# Patient Record
Sex: FEMALE | Race: WHITE
[De-identification: names, ages, dates, MRNs, and addresses within clinical notes are randomized per-mention and may not be internally consistent; named-entity substitution may affect disease eponyms.]

---

## 2019-10-25 ENCOUNTER — HOSPITAL ENCOUNTER (EMERGENCY)
Dept: HOSPITAL 97 - ER | Age: 29
LOS: 1 days | Discharge: HOME | End: 2019-10-26
Payer: COMMERCIAL

## 2019-10-25 DIAGNOSIS — O20.0: Primary | ICD-10-CM

## 2019-10-25 DIAGNOSIS — Z3A.13: ICD-10-CM

## 2019-10-25 DIAGNOSIS — O23.41: ICD-10-CM

## 2019-10-25 LAB
HCT VFR BLD CALC: 36.4 % (ref 36–45)
LYMPHOCYTES # SPEC AUTO: 1.8 K/UL (ref 0.7–4.9)
PMV BLD: 7.8 FL (ref 7.6–11.3)
RBC # BLD: 4 M/UL (ref 3.86–4.86)

## 2019-10-25 PROCEDURE — 85025 COMPLETE CBC W/AUTO DIFF WBC: CPT

## 2019-10-25 PROCEDURE — 87088 URINE BACTERIA CULTURE: CPT

## 2019-10-25 PROCEDURE — 87086 URINE CULTURE/COLONY COUNT: CPT

## 2019-10-25 PROCEDURE — 36415 COLL VENOUS BLD VENIPUNCTURE: CPT

## 2019-10-25 PROCEDURE — 76817 TRANSVAGINAL US OBSTETRIC: CPT

## 2019-10-25 PROCEDURE — 81025 URINE PREGNANCY TEST: CPT

## 2019-10-25 PROCEDURE — 84702 CHORIONIC GONADOTROPIN TEST: CPT

## 2019-10-25 PROCEDURE — 96374 THER/PROPH/DIAG INJ IV PUSH: CPT

## 2019-10-25 PROCEDURE — 86901 BLOOD TYPING SEROLOGIC RH(D): CPT

## 2019-10-25 PROCEDURE — 96361 HYDRATE IV INFUSION ADD-ON: CPT

## 2019-10-25 PROCEDURE — 99284 EMERGENCY DEPT VISIT MOD MDM: CPT

## 2019-10-25 PROCEDURE — 80048 BASIC METABOLIC PNL TOTAL CA: CPT

## 2019-10-25 PROCEDURE — 86900 BLOOD TYPING SEROLOGIC ABO: CPT

## 2019-10-25 PROCEDURE — 81003 URINALYSIS AUTO W/O SCOPE: CPT

## 2019-10-26 VITALS — DIASTOLIC BLOOD PRESSURE: 78 MMHG | OXYGEN SATURATION: 100 % | SYSTOLIC BLOOD PRESSURE: 121 MMHG

## 2019-10-26 VITALS — TEMPERATURE: 98 F

## 2019-10-26 LAB
BUN BLD-MCNC: 8 MG/DL (ref 7–18)
GLUCOSE SERPLBLD-MCNC: 92 MG/DL (ref 74–106)
HCG SERPL-ACNC: (no result) MIU/ML (ref 1–3)
POTASSIUM SERPL-SCNC: 3.5 MMOL/L (ref 3.5–5.1)

## 2019-10-26 NOTE — RAD REPORT
EXAM DESCRIPTION:  US - Transvaginal OB - 10/26/2019 12:12 am

 

CLINICAL HISTORY:   Pregnancy with pelvic pain and vaginal bleeding

 

COMPARISON:  None.

 

FINDINGS:   The uterus measures 14 x 6 x 9 centimeters. A normal appearing gestational sac is present
 within the endometrium. Within this is a yolk sac and fetal pole. Cardiac activity 155 beats per min
marissa. Cephalic presentation

 

BPD 2.3 centimeters 13 weeks 5 days

 

HC 8.5 centimeters 13 weeks 5 days

 

AC 7.2 centimeters 13 weeks 5 days

 

FL 1.2 centimeters 13 weeks 3 days

 

Cervix 3.7 centimeters

 

Posterior placenta. The tip lies 3.2 centimeters from the cervix

 

Neither ovary was visualized

 

No significant free fluid is seen.

 

IMPRESSION:   Single live intrauterine pregnancy with an estimated gestational age 13 weeks 4 days ED
D 04/27/2020

 

Cephalic presentation

 

If a fetal survey is desired it should be performed in approximately 5 weeks

## 2019-10-26 NOTE — ER
Nurse's Notes                                                                                     

 [facility]                                                                 

Name: Donald Perera                                                                          

Age: 29 yrs                                                                                       

Sex: Female                                                                                       

: 1990                                                                                   

MRN: [id number]                                                                                   

Arrival Date: 10/25/2019                                                                          

Time: 22:47                                                                                       

Account#: [id number]                                                                            

Bed 5                                                                                             

Private MD: Shima Irving K                                                                     

Diagnosis: Pregnancy related conditions, unspecified, second trimester;Threatened ;Urinary

  tract infection, site not specified                                                             

                                                                                                  

Presentation:                                                                                     

10/25                                                                                             

23:00 Presenting complaint: Patient states: I went to Dr. Malcolm yesterday due to spotting     jb4 

      that had stopped after I got there. Today about 20 minutes ago I started bleeding           

      bright red blood and having cramps. Transition of care: patient was not received from       

      another setting of care. Onset of symptoms was 2019. Risk Assessment: Do        

      you want to hurt yourself or someone else? Patient reports no desire to harm self or        

      others. Initial Sepsis Screen: Does the patient meet any 2 criteria? No. Patient's          

      initial sepsis screen is negative. Does the patient have a suspected source of              

      infection? No. Patient's initial sepsis screen is negative. Care prior to arrival: CPR.     

23:00 Method Of Arrival: Ambulatory                                                           jb4 

23:00 Acuity: CHRIS 3                                                                           jb4 

                                                                                                  

OB/GYN:                                                                                           

23:02 LMP N/A - Currently pregnant.                                                           jb4 

23:20  2, Full Term 1, Premature 0,  0, Living 0                               arnaud 

                                                                                                  

Historical:                                                                                       

- Allergies:                                                                                      

23:02 No Known Allergies;                                                                     jb4 

- Home Meds:                                                                                      

23:02 Prenatal Vitamin Oral [Active];                                                         jb4 

- PMHx:                                                                                           

23:02 None;                                                                                   jb4 

- PSHx:                                                                                           

23:02 None;                                                                                   jb4 

                                                                                                  

- Immunization history:: Adult Immunizations up to date.                                          

- Social history:: Smoking status: Patient/guardian denies using tobacco,                         

  Patient/guardian denies using alcohol.                                                          

- Ebola Screening: : No symptoms or risks identified at this time.                                

- Family history:: not pertinent.                                                                 

                                                                                                  

                                                                                                  

Screenin:04 Abuse screen: Denies threats or abuse. Nutritional screening: No deficits noted.        jb4 

      Tuberculosis screening: No symptoms or risk factors identified. Fall Risk None              

      identified.                                                                                 

                                                                                                  

Assessment:                                                                                       

23:04 General: Appears in no apparent distress. uncomfortable, Behavior is calm, cooperative, jb4 

      appropriate for age. Pain: Complains of pain in abdomen Pain does not radiate. Pain         

      currently is 4 out of 10 on a pain scale. Quality of pain is described as crampy.           

      Neuro: Level of Consciousness is awake, alert, obeys commands, Oriented to person,          

      place, time, situation. Cardiovascular: Patient's skin is warm and dry. Respiratory:        

      Airway is patent Respiratory effort is even, unlabored, Respiratory pattern is regular,     

      symmetrical. GI: No deficits noted. No signs and/or symptoms were reported involving        

      the gastrointestinal system. : tamara blood, Reports vaginal bleeding that is bright       

      red. EENT: No deficits noted. No signs and/or symptoms were reported regarding the EENT     

      system. Derm: Skin is intact, Skin is pink, warm \T\ dry. Musculoskeletal: Circulation,     

      motion, and sensation intact. Range of motion: intact in all extremities.                   

10/26                                                                                             

00:32 Reassessment: Patient appears in no apparent distress at this time. Patient and/or      jb4 

      family updated on plan of care and expected duration. Pain level reassessed. Patient is     

      alert, oriented x 3, equal unlabored respirations, skin warm/dry/pink.                      

01:18 Reassessment: Patient appears in no apparent distress at this time. Patient and/or      jb4 

      family updated on plan of care and expected duration. Pain level reassessed. Patient is     

      alert, oriented x 3, equal unlabored respirations, skin warm/dry/pink.                      

                                                                                                  

Vital Signs:                                                                                      

10/25                                                                                             

23:02  / 73; Pulse 71; Resp 18; Temp 98.0(O); Pulse Ox 100% on R/A; Weight 108.86 kg    jb4 

      (R); Height 5 ft. 7 in. (170.18 cm) (R); Pain 4/10;                                         

10/26                                                                                             

00:15  / 78; Pulse 78; Resp 16; Pulse Ox 100% on R/A;                                   jb4 

01:15  / 76; Pulse 76; Resp 16; Pulse Ox 100% on R/A;                                   jb4 

10/25                                                                                             

23:02 Body Mass Index 37.59 (108.86 kg, 170.18 cm)                                            [facility] 

                                                                                                  

ED Course:                                                                                        

10/25                                                                                             

22:47 Patient arrived in ED.                                                                  mr  

22:48 Shima Irving MD is Private Physician.                                                mr  

23:00 Scott Manzano, RN is Primary Nurse.                                                     4 

23:01 Triage completed.                                                                       4 

23:02 Barron Stanley MD is Attending Physician.                                             arnaud 

23:02 Arm band placed on left wrist.                                                          jb4 

23:04 Patient has correct armband on for positive identification. Placed in gown. Bed in low  jb4 

      position. Call light in reach. Side rails up X 1. Pulse ox on. NIBP on. Warm blanket        

      given.                                                                                      

23:34 Initial lab(s) drawn, by me, sent to lab. Inserted saline lock: 20 gauge in right       jb4 

      antecubital area, using aseptic technique. Blood collected.                                 

10/26                                                                                             

00:15 Ultrasound completed. Patient tolerated well. Notified ED Physician delvin.           sg3 

00:16 US Transvaginal Ob In Process Unspecified.                                              [facility]

01:04 Endy Bonilla MD is Referral Physician.                                              [facility] 

01:19 No provider procedures requiring assistance completed. IV discontinued, intact,         jb4 

      bleeding controlled, No redness/swelling at site. Pressure dressing applied.                

                                                                                                  

Administered Medications:                                                                         

10/25                                                                                             

23:54 Drug: NS 0.9% 1000 ml Route: IV; Rate: 1 bolus; Site: right antecubital;                jb4 

10/26                                                                                             

01:18 Follow up: Response: No adverse reaction; IV Status: Order to discontinue infusion; IV  jb4 

      Intake: 800ml                                                                               

01:17 Drug: Rocephin 1 grams Route: IV; Rate: per protocol; Site: right antecubital;          jb4 

01:18 Follow up: Response: No adverse reaction; Medication administered at discharge.; IV     jb4 

      Status: Completed infusion                                                                  

                                                                                                  

                                                                                                  

Intake:                                                                                           

01:18 IV: 800ml; Total: 800ml.                                                                jb4 

                                                                                                  

Outcome:                                                                                          

01:04 Discharge ordered by MD.                                                                [facility] 

01:19 Discharged to home ambulatory, with family.                                             jb4 

01:19 Condition: stable                                                                           

01:19 Discharge instructions given to patient, family, Instructed on discharge instructions,      

      follow up and referral plans. medication usage, Demonstrated understanding of               

      instructions, follow-up care, medications, Prescriptions given X 2.                         

01:20 Patient left the ED.                                                                    jb4 

                                                                                                  

Signatures:                                                                                       

Dispatcher [employer]                           Barron Trinh MD MD cha Rivera, Scott Ramon RN                       RN   jb4                                                  

Marta Guerrero                               sg3                                                  

                                                                                                  

************************************************************************************************** COMFORT care.  end of life care

## 2019-10-26 NOTE — EDPHYS
Physician Documentation                                                                           

 [facility]                                                                 

Name: Donald Perera                                                                          

Age: 29 yrs                                                                                       

Sex: Female                                                                                       

: 1990                                                                                   

MRN: [id number]                                                                                   

Arrival Date: 10/25/2019                                                                          

Time: 22:47                                                                                       

Account#: [id number]                                                                            

Bed 5                                                                                             

Private MD: Shima Irving K ED Physician Barron Stanley                                                                      

HPI:                                                                                              

10/25                                                                                             

23:20 This 29 yrs old  Female presents to ER via Ambulatory with complaints of 13    arnaud 

      wks pregnant, Vaginal Bleeding.                                                             

23:20 The patient presents with vaginal bleeding that is light. Onset: The symptoms/episode   arnaud 

      began/occurred 1 day(s) ago. Modifying factors: The symptoms are alleviated by nothing,     

      the symptoms are aggravated by nothing. Modifying factors: The symptoms are alleviated      

      by. Associated signs and symptoms: The patient has no apparent associated signs or          

      symptoms. Severity of symptoms: At their worst the symptoms were mild. The patient is       

      sexually active, reportedly has a single partner.                                           

                                                                                                  

OB/GYN:                                                                                           

23:02 LMP N/A - Currently pregnant.                                                           jb4 

23:20  2, Full Term 1, Premature 0,  0, Living 0                               arnaud 

                                                                                                  

Historical:                                                                                       

- Allergies:                                                                                      

23:02 No Known Allergies;                                                                     jb4 

- Home Meds:                                                                                      

23:02 Prenatal Vitamin Oral [Active];                                                         jb4 

- PMHx:                                                                                           

23:02 None;                                                                                   jb4 

- PSHx:                                                                                           

23:02 None;                                                                                   jb4 

                                                                                                  

- Immunization history:: Adult Immunizations up to date.                                          

- Social history:: Smoking status: Patient/guardian denies using tobacco,                         

  Patient/guardian denies using alcohol.                                                          

- Ebola Screening: : No symptoms or risks identified at this time.                                

- Family history:: not pertinent.                                                                 

                                                                                                  

                                                                                                  

ROS:                                                                                              

23:20 Constitutional: Negative for fever, chills, and weight loss, Eyes: Negative for injury, arnaud 

      pain, redness, and discharge, ENT: Negative for injury, pain, and discharge, Neck:          

      Negative for injury, pain, and swelling, Cardiovascular: Negative for chest pain,           

      palpitations, and edema, Respiratory: Negative for shortness of breath, cough,              

      wheezing, and pleuritic chest pain, Abdomen/GI: Negative for abdominal pain, nausea,        

      vomiting, diarrhea, and constipation, Back: Negative for injury and pain, MS/Extremity:     

      Negative for injury and deformity, Skin: Negative for injury, rash, and discoloration,      

      Neuro: Negative for headache, weakness, numbness, tingling, and seizure, Psych:             

      Negative for depression, anxiety, suicide ideation, homicidal ideation, and                 

      hallucinations, Allergy/Immunology: Negative for hives, rash, and allergies, Endocrine:     

      Negative for neck swelling, polydipsia, polyuria, polyphagia, and marked weight             

      changes, Hematologic/Lymphatic: Negative for swollen nodes, abnormal bleeding, and          

      unusual bruising.                                                                           

23:20 : Positive for pelvic pain, vaginal bleeding, of the suprapubic area.                     

                                                                                                  

Exam:                                                                                             

23:20 Constitutional:  This is a well developed, well nourished patient who is awake, alert,  arnaud 

      and in no acute distress. Head/Face:  Normocephalic, atraumatic. Eyes:  Pupils equal        

      round and reactive to light, extra-ocular motions intact.  Lids and lashes normal.          

      Conjunctiva and sclera are non-icteric and not injected.  Cornea within normal limits.      

      Periorbital areas with no swelling, redness, or edema. ENT:  Nares patent. No nasal         

      discharge, no septal abnormalities noted.  Tympanic membranes are normal and external       

      auditory canals are clear.  Oropharynx with no redness, swelling, or masses, exudates,      

      or evidence of obstruction, uvula midline.  Mucous membranes moist. Neck:  Trachea          

      midline, no thyromegaly or masses palpated, and no cervical lymphadenopathy.  Supple,       

      full range of motion without nuchal rigidity, or vertebral point tenderness.  No            

      Meningismus. Chest/axilla:  Normal chest wall appearance and motion.  Nontender with no     

      deformity.  No lesions are appreciated. Cardiovascular:  Regular rate and rhythm with a     

      normal S1 and S2.  No gallops, murmurs, or rubs.  Normal PMI, no JVD.  No pulse             

      deficits. Respiratory:  Lungs have equal breath sounds bilaterally, clear to                

      auscultation and percussion.  No rales, rhonchi or wheezes noted.  No increased work of     

      breathing, no retractions or nasal flaring. Abdomen/GI:  Soft, non-tender, with normal      

      bowel sounds.  No distension or tympany.  No guarding or rebound.  No evidence of           

      tenderness throughout. Back:  No spinal tenderness.  No costovertebral tenderness.          

      Full range of motion. MS/ Extremity:  Pulses equal, no cyanosis.  Neurovascular intact.     

       Full, normal range of motion. Neuro:  Awake and alert, GCS 15, oriented to person,         

      place, time, and situation.  Cranial nerves II-XII grossly intact.  Motor strength 5/5      

      in all extremities.  Sensory grossly intact.  Cerebellar exam normal.  Normal gait.         

      Psych:  Awake, alert, with orientation to person, place and time.  Behavior, mood, and      

      affect are within normal limits.                                                            

23:20 Abdomen/GI: Inspection: gravid appearance, obese Bowel sounds: active, Palpation: mild      

      abdominal tenderness, in the suprapubic area, right lower quadrant and left lower           

      quadrant, Liver: no appreciated palpable abnormalities, Hernia: not appreciated.            

23:20 : CVA tenderness, is absent, Sexual behavior: the patient is sexually active.             

                                                                                                  

Vital Signs:                                                                                      

23:02  / 73; Pulse 71; Resp 18; Temp 98.0(O); Pulse Ox 100% on R/A; Weight 108.86 kg    jb4 

      (R); Height 5 ft. 7 in. (170.18 cm) (R); Pain 4/10;                                         

10/26                                                                                             

00:15  / 78; Pulse 78; Resp 16; Pulse Ox 100% on R/A;                                   jb4 

01:15  / 76; Pulse 76; Resp 16; Pulse Ox 100% on R/A;                                   jb4 

10/25                                                                                             

23:02 Body Mass Index 37.59 (108.86 kg, 170.18 cm)                                            jb4 

                                                                                                  

MDM:                                                                                              

10/25                                                                                             

23:02 Patient medically screened.                                                             [facility] 

23:24 Data reviewed: vital signs, nurses notes, lab test result(s), radiologic studies,       arnaud 

      ultrasound.                                                                                 

                                                                                                  

10/25                                                                                             

23:19 Order name: Quantitative Hcg; Complete Time: 01:04                                      [facility] 

10/25                                                                                             

23:19 Order name: Abo/rh Typing; Complete Time: 01:04                                         [facility] 

10/25                                                                                             

23:19 Order name: Basic Metabolic Panel; Complete Time: 01:04                                 [facility] 

10/25                                                                                             

23:19 Order name: CBC with Diff; Complete Time: 00:10                                         [facility] 

10/25                                                                                             

23:19 Order name: Urine Culture                                                               [facility] 

10/25                                                                                             

23:56 Order name: Urine Dipstick--Ancillary (enter results)                                   [facility] 

10/25                                                                                             

23:19 Order name: Urine Pregnancy Test (obtain specimen); Complete Time: 23:39                [facility] 

10/25                                                                                             

23:19 Order name: IV Saline Lock; Complete Time: 23:39                                        [facility] 

10/25                                                                                             

23:19 Order name: Labs collected and sent; Complete Time: 23:39                               [facility] 

10/25                                                                                             

23:19 Order name: NPO; Complete Time: 23:39                                                   [facility] 

10/25                                                                                             

23:19 Order name: US Transvaginal Ob                                                          [facility] 

10/25                                                                                             

23:56 Order name: Urine Pregnancy--Ancillary (enter results)                                  [facility] 

10/25                                                                                             

23:19 Order name: Urine Dipstick-Ancillary (obtain specimen); Complete Time: 23:39            [facility] 

                                                                                                  

Administered Medications:                                                                         

23:54 Drug: NS 0.9% 1000 ml Route: IV; Rate: 1 bolus; Site: right antecubital;                jb4 

10/26                                                                                             

01:18 Follow up: Response: No adverse reaction; IV Status: Order to discontinue infusion; IV  jb4 

      Intake: 800ml                                                                               

01:17 Drug: Rocephin 1 grams Route: IV; Rate: per protocol; Site: right antecubital;          jb4 

01:18 Follow up: Response: No adverse reaction; Medication administered at discharge.; IV     jb4 

      Status: Completed infusion                                                                  

                                                                                                  

                                                                                                  

Disposition:                                                                                      

10/26/19 01:04 Discharged to Home. Impression: Pregnancy related conditions, unspecified,         

  second trimester, Threatened , Urinary tract infection, site not                        

  specified.                                                                                      

- Condition is Stable.                                                                            

- Discharge Instructions: Threatened Miscarriage, Vaginal Bleeding During Pregnancy,              

  Second Trimester, Threatened Miscarriage, Easy-to-Read, Pelvic Rest.                            

- Prescriptions for Prenatal Vitamin 27- 0.8 mg Oral Tablet - take 1 tablet by ORAL               

  route once daily; 30 tablet. Macrobid 100 mg Oral Capsule - take 1 capsule by ORAL              

  route every 12 hours for 7 days; 14 capsule.                                                    

- Medication Reconciliation Form, Thank You Letter, Antibiotic Education, Prescription            

  Opioid Use form.                                                                                

- Follow up: Endy Bonilla; When: 1 - 2 days; Reason: Recheck today's complaints,                

  Continuance of care, Re-evaluation by your physician.                                           

- Problem is new.                                                                                 

- Symptoms have improved.                                                                         

                                                                                                  

                                                                                                  

                                                                                                  

Signatures:                                                                                       

Dispatcher [employer]                           Barron Trinh MD MD cha Bryson, James, RN                       RN   jb4                                                  

                                                                                                  

Corrections: (The following items were deleted from the chart)                                    

01:06 01:04 10/26/2019 01:04 Discharged to Home. Impression: Pregnancy related conditions,    arnaud 

      unspecified, second trimester; Threatened . Condition is Stable. Discharge          

      Instructions: Threatened Miscarriage, Vaginal Bleeding During Pregnancy, Second             

      Trimester, Threatened Miscarriage, Easy-to-Read, Pelvic Rest. Prescriptions for             

      Prenatal Vitamin 27-0.8 mg Oral Tablet - take 1 tablet by ORAL route once daily; 30         

      tablet. and Forms are Medication Reconciliation Form, Thank You Letter, Antibiotic          

      Education, Prescription Opioid Use. Follow up: Endy Bonilla; When: 1 - 2 days;            

      Reason: Recheck today's complaints, Continuance of care, Re-evaluation by your              

      physician. Problem is new. Symptoms have improved. arnaud                                      

01:20 01:06 10/26/2019 01:04 Discharged to Home. Impression: Pregnancy related conditions,    jb4 

      unspecified, second trimester; Threatened ; Urinary tract infection, site not       

      specified. Condition is Stable. Discharge Instructions: Threatened Miscarriage, Vaginal     

      Bleeding During Pregnancy, Second Trimester, Threatened Miscarriage, Easy-to-Read,          

      Pelvic Rest. Prescriptions for Prenatal Vitamin 27-0.8 mg Oral Tablet - take 1 tablet       

      by ORAL route once daily; 30 tablet. and Forms are Medication Reconciliation Form,          

      Thank You Letter, Antibiotic Education, Prescription Opioid Use. Follow up: Endy Bonilla; When: 1 - 2 days; Reason: Recheck today's complaints, Continuance of care,           

      Re-evaluation by your physician. Problem is new. Symptoms have improved. arnaud                

                                                                                                  

**************************************************************************************************

## 2020-04-08 ENCOUNTER — HOSPITAL ENCOUNTER (INPATIENT)
Dept: HOSPITAL 97 - 2ND-WC | Age: 30
LOS: 3 days | Discharge: HOME | End: 2020-04-11
Attending: SPECIALIST | Admitting: SPECIALIST
Payer: COMMERCIAL

## 2020-04-08 VITALS — BODY MASS INDEX: 40.4 KG/M2

## 2020-04-08 DIAGNOSIS — Z3A.37: ICD-10-CM

## 2020-04-08 LAB
HCT VFR BLD CALC: 34.1 % (ref 36–45)
LYMPHOCYTES # SPEC AUTO: 1.6 K/UL (ref 0.7–4.9)
PMV BLD: 10.6 FL (ref 7.6–11.3)
RBC # BLD: 3.76 M/UL (ref 3.86–4.86)
UA COMPLETE W REFLEX CULTURE PNL UR: (no result)
UA DIPSTICK W REFLEX MICRO PNL UR: (no result)

## 2020-04-08 PROCEDURE — 83735 ASSAY OF MAGNESIUM: CPT

## 2020-04-08 PROCEDURE — 99218: CPT

## 2020-04-08 PROCEDURE — 80076 HEPATIC FUNCTION PANEL: CPT

## 2020-04-08 PROCEDURE — 81015 MICROSCOPIC EXAM OF URINE: CPT

## 2020-04-08 PROCEDURE — 86901 BLOOD TYPING SEROLOGIC RH(D): CPT

## 2020-04-08 PROCEDURE — 87086 URINE CULTURE/COLONY COUNT: CPT

## 2020-04-08 PROCEDURE — 81003 URINALYSIS AUTO W/O SCOPE: CPT

## 2020-04-08 PROCEDURE — 84550 ASSAY OF BLOOD/URIC ACID: CPT

## 2020-04-08 PROCEDURE — 85730 THROMBOPLASTIN TIME PARTIAL: CPT

## 2020-04-08 PROCEDURE — 74018 RADEX ABDOMEN 1 VIEW: CPT

## 2020-04-08 PROCEDURE — 86592 SYPHILIS TEST NON-TREP QUAL: CPT

## 2020-04-08 PROCEDURE — 87088 URINE BACTERIA CULTURE: CPT

## 2020-04-08 PROCEDURE — 88307 TISSUE EXAM BY PATHOLOGIST: CPT

## 2020-04-08 PROCEDURE — 36415 COLL VENOUS BLD VENIPUNCTURE: CPT

## 2020-04-08 PROCEDURE — 85014 HEMATOCRIT: CPT

## 2020-04-08 PROCEDURE — 85025 COMPLETE CBC W/AUTO DIFF WBC: CPT

## 2020-04-08 PROCEDURE — 80048 BASIC METABOLIC PNL TOTAL CA: CPT

## 2020-04-08 PROCEDURE — 81001 URINALYSIS AUTO W/SCOPE: CPT

## 2020-04-08 PROCEDURE — 90715 TDAP VACCINE 7 YRS/> IM: CPT

## 2020-04-08 PROCEDURE — 87340 HEPATITIS B SURFACE AG IA: CPT

## 2020-04-08 PROCEDURE — 90471 IMMUNIZATION ADMIN: CPT

## 2020-04-09 LAB — RPR SER QL: (no result)

## 2020-04-09 PROCEDURE — 10907ZC DRAINAGE OF AMNIOTIC FLUID, THERAPEUTIC FROM PRODUCTS OF CONCEPTION, VIA NATURAL OR ARTIFICIAL OPENING: ICD-10-PCS

## 2020-04-09 PROCEDURE — 3E0P7VZ INTRODUCTION OF HORMONE INTO FEMALE REPRODUCTIVE, VIA NATURAL OR ARTIFICIAL OPENING: ICD-10-PCS

## 2020-04-09 PROCEDURE — 3E033VJ INTRODUCTION OF OTHER HORMONE INTO PERIPHERAL VEIN, PERCUTANEOUS APPROACH: ICD-10-PCS

## 2020-04-09 PROCEDURE — 10H073Z INSERTION OF MONITORING ELECTRODE INTO PRODUCTS OF CONCEPTION, VIA NATURAL OR ARTIFICIAL OPENING: ICD-10-PCS

## 2020-04-09 PROCEDURE — 4A1H7CZ MONITORING OF PRODUCTS OF CONCEPTION, CARDIAC RATE, VIA NATURAL OR ARTIFICIAL OPENING: ICD-10-PCS

## 2020-04-09 NOTE — P.CNS
Date of Consult: 04/09/20


Reason for Consult: Pre-eclampsia with uncontrolled blood pressure


Requesting Physician: LEELEE Bonilla


Chief Complaint: Uncontrolled blood pressure


History of Present Illness: 


29-year-old woman P1, undergoing induced labor and noted to have elevated blood 

pressure.  Patient is diagnosed with pre-eclampsia.  Her systolic blood pressure

was in the 180s and did not respond to IV hydralazine.  Patient was complaining 

of headache.  Hospitalist service consulted to assist with management of her 

blood pressure.  Noted her blood pressure improved after a dose of IV labetalol.

 Patient also mentioned her headache had gotten better after a dose of Tylenol. 

She denies any visual changes.





Allergies





latex Allergy (Verified 04/08/20 18:27)


   Itching








- Past Medical/Surgical History


Diabetic: No


-:  None


-: [place] teeth removed





- Family History


  ** Father


Medical History: Heart disease





- Social History


Smoking Status: Never smoker


Alcohol use: No


CD- Drugs: No


Caffeine use: Yes


Place of Residence: Home





Review of Systems


Other: 





Except as documented, all other systems reviewed and negative.





Physical Examination


General: Alert, In no apparent distress, Oriented x3


HEENT: Atraumatic, Normocephalic, PERRLA, Mucous membr. moist/pink, EOMI, 

Sclerae nonicteric


Neck: Supple, JVD not distended


Respiratory: Clear to auscultation bilaterally, Normal air movement


Cardiovascular: Regular rate/rhythm, Normal S1 S2, Edema (2+ bilateral lower 

extremity pitting edema)


Capillary refill: <2 Seconds


Gastrointestinal: Normal bowel sounds, Soft and benign, Other (Gravid abdomen)


Musculoskeletal: No erythema


Integumentary: No rashes


Neurological: Normal speech, Normal strength at 5/5 x4 extr, Cranial nerves 3-12

intact, Normal affect


Laboratory Data (last 24 hrs)





04/09/20 13:49: Magnesium 5.8 H* D


04/09/20 04:14: Magnesium 4.6 H*


04/08/20 16:00: WBC 9.6  D, Hgb 11.7 L, Hct 34.1 L, Plt Count 196








- Problems


(1) Preeclampsia


Current Visit: Yes   Status: Acute   





(2) Headache


Current Visit: Yes   Status: Acute   


Conclusions/Impression: 


Would recommend oral Labetalol after labor.


Labetalol starting dose 100 mg prescribed to be titrated every 2-3 days for a 

target systolic blood pressure less than 140.


Continue to give labetalol IV p.r.n. for BP spikes.  Target systolic blood 

pressure of less than 140.

## 2020-04-09 NOTE — PN
The last blood pressure is 141 systolic after 20 mg labetalol.  We will switch to labetalol now inste
ad of Apresoline and give at any time, the systolic is 165 or more or diastolic 105 or more.  We will
 try to get her membranes ruptured as soon as we can to facilitate delivery.  After delivery, I think
 the situation with her blood pressure will improve and at that point, we can give her clonidine or o
ther medicines that right now, I am stand away from because we do not want a drop the blood pressures
 to the point where the baby could be effected.





IDANIA/SURI

DD:  04/09/2020 07:54:25Voice ID:  [id number]

DT:  04/09/2020 08:32:29Report ID:  [id number]

## 2020-04-09 NOTE — PN
The patient has had two 20 mg doses of labetalol. Last blood pressure is 159 systolic.  She has a mil
d headache returning.  We will give her couple of Tylenol and of course keep a close watch on her blo
od pressures, give her another 20 mg of labetalol if needed.  She is now 3.5 cm, 60% effaced, vertex,
 -1 station.  So, she is starting to make some progress.  She is having regular contractions but they
 are still not extremely forceful.  We will continue to go up on the Pitocin until we reach the 20 to
 22 milliunits range.  Epidural anesthesia probably will be instituted when the patient gets to be ab
out 4 cm and the baby is a little bit lower.  She is doing quite well now and in good control with si
mply breathing.  Full discussion.  Magnesium level at 2 p.m.  The first level was well within the the
rapeutic range.





NBC/MODL

DD:  04/09/2020 13:34:41Voice ID:  [id number]

DT:  04/09/2020 15:00:26Report ID:  [id number]

## 2020-04-09 NOTE — PN
2.5 to 3 cm, still posterior baby, -2 station but with contraction applied, therefore rupture of memb
ranes was performed.  Clear fluid.  Baby settled down against the cervix better.  Blood pressures hav
e moderated.  Headache is getting better.  Hopefully, she will go into more active phase of labor now
 and we can proceed.  Full discussion with the patient's .





IDANIA/SURI

DD:  04/09/2020 08:06:34Voice ID:  [id number]

DT:  04/09/2020 08:15:35Report ID:  [id number]

## 2020-04-09 NOTE — PREOPHP
Date of Admission:  2020



Patient has made no progress.  For the last several hours, she has been 2-1/2 to 3 -1 station.  Baby 
is occiput posterior.  I think she was about 2 this morning.  So she has made minimal progress.  She 
had 3 doses of Cytotec.  She is on 24 milliunits of Pitocin.  Dr. Elkins attempted epidural and coul
d not get it.  The patient states that she wishes to proceed on with  section at this point. 
 Infection; blood loss; anesthetic complications; injury to bladder, bowel, ureter; postoperative com
plications; clots in legs; pneumonia discussed.  Patient knows fully well this does not constitute al
l possible problems that could occur during or following surgery.  She knows that if we cannot get ad
equate spinal we will use general anesthetic.  Her blood pressures have been very labile, at times in
 the 180 range.  She was given about 4 doses of 5 mg of Apresoline and then we switched to labetalol.
  She has had 3-4 doses about also.  Right now, blood pressures are normal.  Baby looks good, but aft
er we get the baby out we can be a little bit more aggressive in treating the blood pressures.



Family History:  Noncontributory.



Allergies:  SHE HAS AN ALLERGY TO LATEX.



Physical Examination:

Heart and lungs:  Clear. 

Breasts:  Without masses on previous visits. 

Abdomen:  Term size. 

Extremities:  Clear.  She does have +2 edema, but still has normal reflexes. 

:  Cervical as stated is not change with the patient approximately 3 cm and the cervix is still yessica
ewhat posterior.  The baby is at least -1 station, but looks good on the monitor. 



At this point, we will proceed with  delivery expeditiously.





IDANIA/SURI

DD:  2020 19:19:44Voice ID:  [id number]

## 2020-04-09 NOTE — PN
Patient is having firm contractions now.  She is starting to feel more than she was previously.  We h
ave just moved to 24 milliunits of Pitocin, it was stopped at that point.  The baby looks good.  We p
ut on a scalp electrode.  No signs of any problems at this point.  Blood pressures have moderated, le
ss than 165 systolic.  She has been given two 20 mg doses of labetalol.  She says she really does not
 have a headache at this point.  Still little dizzy from the Stadol she was given earlier.  Pros and 
cons about giving an epidural right now, she is having back discomfort therefore probably has an occi
put posterior presentation of the baby.  She will start doing pelvic rocks.  We will check her again 
in about an hour and a half and see if she has made any progress.  She knows she can have the epidura
l at any time but right now, she is willing to wait a little bit longer.  Once she gets the epidural 
this might help her blood pressure but she knows that is not the prime reason for giving an epidural.
  Cervical exam is still not basically changed.  She is 3-1/2, 50 to 60% effaced. She was still -1 st
ation.





IDANIA/SURI

DD:  04/09/2020 16:27:03Voice ID:  [id number]

DT:  04/09/2020 20:27:17Report ID:  [id number]

## 2020-04-09 NOTE — PN
Blood pressures have been very labile.  During the night, she has been given Apresoline 5 mg on 3 to 
4 occasions, which causes her blood pressure to come down, but it comes back fairly rapidly.  In akin
tion, patient has developed a headache.  Reflexes are still normal.  We have switched to labetalol 20
 mg IV now and we will repeat that in 15 to 20 minutes until her blood pressure has come down to a mo
re reasonable range.  Last systolic was 181.  I have consulted Dr. Stockton for neurology to see if w
angeles have any other suggestions.  I have also consulted hospital internist who is more readily available
 to see if they have any suggestions.  Labetalol was suggested by the internist, but I have asked him
 to come and see her, anyway we need consultation because of the neurological involvement now.  Full 
discussion with patient and .





IDANIA/SURI

DD:  04/09/2020 07:38:32Voice ID:  [id number]

DT:  04/09/2020 09:20:48Report ID:  [id number]

## 2020-04-09 NOTE — PN
Darron regularly but she still does not seem to be significantly uncomfortable.  Baby looks good
 on the monitor.  The fluid is clear.  She has had 2 doses now of 20 mg of labetalol and it brings he
r blood pressure down to less than 165.  After the delivery, the hospitalist has recommended that she
 be put on 100 mg of labetalol twice a day, which we will institute after the delivery.  She is not m
aking any good progress right now.  We will continue to increase the oxytocin until we get firm contr
actions.  They are very regular, but they really have not started causing her any significant discomf
ort, and there has been no discernible descent of the vertex at this point.  Magnesium level within a
 normal range this morning, we will get another one here in the next couple of hours.





IDANIA/SURI

DD:  04/09/2020 11:53:44Voice ID:  [id number]

DT:  04/09/2020 12:28:33Report ID:  [id number]

## 2020-04-09 NOTE — PREOPHP
Date of Admission:  2020



29-year-old  2, para 1, 37 weeks and 1 to 2 days noted to have preeclampsia, blood pressure is
 140/90 range, +1 protein, significant edema +2, reflexes basically normal.  No CNS symptoms.  Baby i
s vertex, but still very high.  Cervix is 1 cm, soft.  Patient had Cervidil induction with her first 
labor, Cytotec __________ given 50 mcg inserted, full discussion about risks and complications and in
creased risk of .  Anticipate delivery sometime tomorrow.  She is Rh positive, immune to Rube
lla, negative beta strep screen.





IDANIA/SURI

DD:  2020 16:36:14Voice ID:  [id number]

## 2020-04-09 NOTE — OP
Surgeon:  Endy Bonilla MD



Assistant:  Dr. David is assistant surgeon.



History:  A 29-year-old  2, para 1, with severe preeclampsia.  Patient progressed no more than
 3 cm, baby stayed high the entire time in occiput posterior.  Epidural anesthesia was attempted by SHIVANI Elkins and he could not get the epidural and the patient requested  delivery.  Pros and c
ons of this discussed, but she has made no progress during the day in spite of 3 doses of Cytotec and
 Pitocin up to 24 milliunits.  At her request, oxytocin was discontinued, magnesium sulfate was tempo
rarily discontinued.  She had approximately 4 doses of 5 mg of Apresoline during her labor for blood 
pressure control and 3-4 doses of labetalol 20 mg IV.  Infection, blood loss, anesthetic complication
s, injury to bladder, bowel, ureter, postoperative complications, clots in legs, and pneumonia were d
iscussed.  Patient knows fully well this does not constitute all the possible problems that could occ
ur during or following surgery.



Anesthesia:  Spinal block was established by Dr. Elkins.



Description Of Procedure:  After prepping and draping, time-out was performed.  A Pfannenstiel incisi
on was made, the incision was carried to the fascia.  The fascia was incised and incision carried tra
nsversely bilaterally.  Anterior and posterior fascial planes were developed with both blunt and victor manuel
p dissection.  The underlying rectus muscle was .  Peritoneum entered bluntly, low transvers
e bladder flap developed, low transverse uterine incision created.  A 5-pound 9-ounce female delivere
d in straight occiput posterior.  Nuchal cord tightly x2.  Apgars 9 and 9.  Placenta removed manually
.  Uterus cleared of clot and blood.  The uterus closed with a running locked stitch of 1 chromic fol
lowed by an imbricating stitch of 1 chromic followed by 2 figure-of-eight stitches in the left angle 
for complete hemostasis.  Estimated blood loss 850 mL.  Uterus was replaced in the peritoneal cavity.
  Gutters clear of clot and blood.  No further bleeding seen.  At this point, the muscles were reappr
oximated with 3 interrupted sutures of 0 Vicryl.  The fascia was closed with 1 PDS, running from eith
er angle to the midline.  Subcutaneous tissue was closed with a running stitch of 2-0 plain, then sta
ples placed.  The patient has been given 2 g of Ancef for prophylaxis.  Tolerated all procedures well
.  She will be transferred back to her room in good condition.  We will restart the magnesium sulfate
 and continue until we see diuresis.  Blood pressures will be of course watched carefully, any readin
g of 165 systolic or 105 diastolic, we will either give labetalol are clonidine at this point since t
he baby is out.



Diagnosis:  Diagnosis to this point is intrauterine gestation 37 weeks 2 days, severe preeclampsia Cy
totec induction, failure to progress in labor, primary  section, spinal block anesthesia, nuc
brian cord x2.





NBC/MODL

DD:  2020 20:41:04Voice ID:  [id number]

DT:  2020 22:12:45Report ID:  [id number]

## 2020-04-10 VITALS — OXYGEN SATURATION: 100 %

## 2020-04-10 RX ADMIN — LABETALOL HYDROCHLORIDE SCH MG: 100 TABLET, FILM COATED ORAL at 21:56

## 2020-04-10 RX ADMIN — LABETALOL HYDROCHLORIDE SCH MG: 100 TABLET, FILM COATED ORAL at 08:50

## 2020-04-10 RX ADMIN — IBUPROFEN PRN MG: 600 TABLET ORAL at 19:00

## 2020-04-10 RX ADMIN — PHENOBARBITAL SCH MG: 32.4 TABLET ORAL at 16:10

## 2020-04-10 RX ADMIN — ACETAMINOPHEN PRN MG: 500 TABLET, FILM COATED ORAL at 12:20

## 2020-04-10 NOTE — PN
Postop, patient has done well.  H and H with basically no change.  Lochia is normal.  No CNS symptoms
.  No headaches or any other problems.  Blood pressures have moderated.  We have not had to give her 
any more labetalol IV since the time of delivery.  Output is good and improving.  We will continue th
e magnesium sulfate.  Better to reduce dose.  Last level was 5.9 until she begins to diurese and then
 stop the magnesium sulfate and start her on phenobarbital at that point.  Patient says she is hungry
 this morning.  We will begin to feed her light food, and as soon as we can get rid of the Valera, we 
will begin ambulation.  Although even with the Valera, she is encouraged to stand beside the bed and p
ossibly even walk in the room.  Seems to be doing quite well at this point.  No post spinal block pro
blems.  Possibly home tomorrow afternoon, if not Sunday.  Full discussion.





IDANIA/SURI

DD:  04/10/2020 07:53:37Voice ID:  [id number]

DT:  04/10/2020 08:19:06Report ID:  [id number]

## 2020-04-10 NOTE — P.PN
Subjective


Date of Service: 04/10/20


Chief Complaint: Uncontrolled blood pressure


Delivery by  section.


Patient currently has no complain.


She denies any visual changes or headache.


Her blood pressure has improved.  Readings noted to be around 140.








Physical Examination





- Vital Signs


Temperature: 98.3 F


Blood Pressure: 139/76


Pulse: 81


Respirations: 18


Pulse Ox (%): 99





- Physical Exam


General: Alert, In no apparent distress, Oriented x3


HEENT: Mucous membr. moist/pink


Neck: Supple, JVD not distended


Respiratory: Clear to auscultation bilaterally, Normal air movement


Cardiovascular: Regular rate/rhythm, Normal S1 S2, Edema (1+ bilateral pedal 

edema.)


Capillary refill: <2 Seconds


Gastrointestinal: Normal bowel sounds, Soft and benign





- Studies


Laboratory Data (last 24 hrs)





04/10/20 05:00: Magnesium Cancelled


04/10/20 05:00: Hct Cancelled


04/10/20 04:55: Hct 34.2 L


04/10/20 04:55: Magnesium 5.9 H*








Assessment And Plan





- Current Problems (Diagnosis)


(1) Preeclampsia


Current Visit: Yes   Status: Acute   





(2) Headache


Current Visit: Yes   Status: Acute   





- Plan


Continue oral Labetalol.  Anticipating systolic blood pressure to be less than 

140 once steady state is reached.


Will sign off.  Please re-consult hospitalist service as needed.

## 2020-04-11 VITALS — SYSTOLIC BLOOD PRESSURE: 148 MMHG | DIASTOLIC BLOOD PRESSURE: 82 MMHG

## 2020-04-11 VITALS — TEMPERATURE: 98.4 F

## 2020-04-11 RX ADMIN — LABETALOL HYDROCHLORIDE SCH MG: 100 TABLET, FILM COATED ORAL at 08:00

## 2020-04-11 RX ADMIN — PHENOBARBITAL SCH MG: 32.4 TABLET ORAL at 00:00

## 2020-04-11 RX ADMIN — PHENOBARBITAL SCH MG: 32.4 TABLET ORAL at 08:00

## 2020-04-11 RX ADMIN — IBUPROFEN PRN MG: 600 TABLET ORAL at 06:13

## 2020-04-11 RX ADMIN — ACETAMINOPHEN PRN MG: 500 TABLET, FILM COATED ORAL at 00:11

## 2020-04-11 NOTE — DS
Hospital Course:  Donald Perera is a 29-year-old  2, para 1, 37 weeks 2 days.  Underwent 
 section for failure to progress and occiput posterior and patient request.  Patient was note
d to have severe preeclampsia and had been given multiple doses of blood pressure medicines to reduce
 her blood pressure.  Had been placed on magnesium sulfate and dilated to no more than 3 cm and the v
ertex never descended into the pelvis.  She has delivered under spinal block anesthesia of 5 pounds a
nd 9 ounces female.  Apgars 9 and 9.  Nuchal cord tightly x2.  Estimated blood loss 850 cc.  Ancef fo
r prophylaxis.  Postoperatively, has done well, is afebrile, ambulating, voiding.  Lochia is normal. 
 Blood pressures have moderated, but they are still in the 150 systolic range, but patient no longer 
has any CNS symptoms.  She was having headaches during the labor intermittently.  Dismissed with phen
obarbital to continue to take for another 3 days, tramadol as needed.  She will request Tdap administ
ration before she leaves.  She has no postspinal block headaches.  Patient will be seen in the office
 next week for staple removal.  To report any problems over the weekend to [facility] Delivery.  Phone
 number has been provided.



Final Diagnoses:  Intrauterine gestation, 37 weeks 2 days, severe preeclampsia, failure to progress. 
 Patient request primary  section.  Spinal block anesthesia.  Nuchal cord x2.





IDANIA/SURI

DD:  2020 07:56:44Voice ID:  [id number]

DT:  2020 08:46:03Report ID:  [id number]

## 2020-04-13 NOTE — PN
Blood pressures are moderated but her headache is better. She is ford regularly but really is 
still not in discomfort yet.  We will start Pitocin at this point and expect faster progress once she
 gets to 4 to 5 cm.





IDANIA/SURI

DD:  04/09/2020 09:30:16Voice ID:  [id number]

DT:  04/09/2020 09:50:59Report ID:  [id number]

## 2020-04-14 ENCOUNTER — HOSPITAL ENCOUNTER (EMERGENCY)
Dept: HOSPITAL 97 - ER | Age: 30
Discharge: HOME | End: 2020-04-14
Payer: COMMERCIAL

## 2020-04-14 VITALS — SYSTOLIC BLOOD PRESSURE: 148 MMHG | DIASTOLIC BLOOD PRESSURE: 70 MMHG

## 2020-04-14 VITALS — TEMPERATURE: 98 F

## 2020-04-14 VITALS — OXYGEN SATURATION: 100 %

## 2020-04-14 DIAGNOSIS — R60.9: ICD-10-CM

## 2020-04-14 DIAGNOSIS — R51: Primary | ICD-10-CM

## 2020-04-14 DIAGNOSIS — Z91.040: ICD-10-CM

## 2020-04-14 DIAGNOSIS — Z91.048: ICD-10-CM

## 2020-04-14 LAB
ALBUMIN SERPL BCP-MCNC: 2.9 G/DL (ref 3.4–5)
ALP SERPL-CCNC: 100 U/L (ref 45–117)
ALT SERPL W P-5'-P-CCNC: 88 U/L (ref 12–78)
AST SERPL W P-5'-P-CCNC: 58 U/L (ref 15–37)
BUN BLD-MCNC: 8 MG/DL (ref 7–18)
GLUCOSE SERPLBLD-MCNC: 93 MG/DL (ref 74–106)
HCT VFR BLD CALC: 34.7 % (ref 36–45)
LYMPHOCYTES # SPEC AUTO: 1.2 K/UL (ref 0.7–4.9)
MAGNESIUM SERPL-MCNC: 1.9 MG/DL (ref 1.8–2.4)
PMV BLD: 7.9 FL (ref 7.6–11.3)
POTASSIUM SERPL-SCNC: 3.8 MMOL/L (ref 3.5–5.1)
RBC # BLD: 3.73 M/UL (ref 3.86–4.86)

## 2020-04-14 PROCEDURE — 83605 ASSAY OF LACTIC ACID: CPT

## 2020-04-14 PROCEDURE — 80048 BASIC METABOLIC PNL TOTAL CA: CPT

## 2020-04-14 PROCEDURE — 85025 COMPLETE CBC W/AUTO DIFF WBC: CPT

## 2020-04-14 PROCEDURE — 71046 X-RAY EXAM CHEST 2 VIEWS: CPT

## 2020-04-14 PROCEDURE — 70450 CT HEAD/BRAIN W/O DYE: CPT

## 2020-04-14 PROCEDURE — 83735 ASSAY OF MAGNESIUM: CPT

## 2020-04-14 PROCEDURE — 96374 THER/PROPH/DIAG INJ IV PUSH: CPT

## 2020-04-14 PROCEDURE — 83930 ASSAY OF BLOOD OSMOLALITY: CPT

## 2020-04-14 PROCEDURE — 80076 HEPATIC FUNCTION PANEL: CPT

## 2020-04-14 PROCEDURE — 36415 COLL VENOUS BLD VENIPUNCTURE: CPT

## 2020-04-14 PROCEDURE — 99284 EMERGENCY DEPT VISIT MOD MDM: CPT

## 2020-04-14 PROCEDURE — 96361 HYDRATE IV INFUSION ADD-ON: CPT

## 2020-04-14 NOTE — EDPHYS
Physician Documentation                                                                           

 [facility]                                                                 

Name: Donald Perera                                                                          

Age: 29 yrs                                                                                       

Sex: Female                                                                                       

: 1990                                                                                   

MRN: [id number]                                                                                   

Arrival Date: 2020                                                                          

Time: 20:50                                                                                       

Account#: [id number]                                                                            

Bed 5                                                                                             

Private MD:                                                                                       

ED Physician Edd Hart                                                                         

HPI:                                                                                              

                                                                                             

21:11 This 29 yrs old  Female presents to ER via Ambulatory with complaints of       snw 

      Headache, High Blood Pressure.                                                              

21:11 The patient complains of pain to the top of head and forehead. The patient describes    snw 

      the headache as constant, unrelenting. Onset: The symptoms/episode began/occurred           

      gradually, 1 week(s) ago, and became persistent. Associated signs and symptoms:             

      Pertinent positives: pregnancy, blurred vision, pre-eclampsia (c/section 5 days ago.        

      Severity of symptoms: At its worst the pain was moderate, severe, in the emergency          

      department the pain is unchanged. Headache History: Other with pregnancy and since. the     

      symptoms are aggravated by movement, HTN. The patient has not experienced similar           

      symptoms in the past. Saw Dr. Bonilla and was given Clonidine, headache lessened x 6          

      hours and "just came back".                                                                 

                                                                                                  

OB/GYN:                                                                                           

21:25 LMP N/A - Recent birth                                                                  rr5 

                                                                                                  

Historical:                                                                                       

- Allergies:                                                                                      

21:06 Latex, Natural Rubber;                                                                  rr5 

- Home Meds:                                                                                      

21:06 ferrous sulfate 325 mg (65 mg iron) oral tab daily [Active]; phenobarbital 60 mg Oral   rr5 

      tab [Active]; clonidine HCl 0.1 mg Oral tab [Active]; sumatriptan succ 100 mg tablet        

      [Active]; tramadol 50 mg Oral tab [Active]; labetalol 100 mg Oral tab [Active];             

- PMHx:                                                                                           

21:06 pre eclampsia;                                                                          rr5 

- PSHx:                                                                                           

21:06 ;                                                                              rr5 

                                                                                                  

- Immunization history:: Adult Immunizations up to date.                                          

- Social history:: Smoking status: unknown Patient/guardian denies using alcohol,                 

  street drugs, tobacco products.                                                                 

                                                                                                  

                                                                                                  

ROS:                                                                                              

21:08 Constitutional: Negative for fever, chills, and weight loss, ENT: Negative for injury,  snw 

      pain, and discharge.                                                                        

21:08 Neck: Negative for injury, pain, and swelling, Cardiovascular: Negative for chest pain,     

      palpitations, and edema, Respiratory: Negative for shortness of breath, cough,              

      wheezing, and pleuritic chest pain, Abdomen/GI: Negative for abdominal pain, nausea,        

      vomiting, diarrhea, and constipation, Back: Negative for injury and pain, : Negative      

      for injury, bleeding, discharge, and swelling, MS/Extremity: Negative for injury and        

      deformity, Skin: Negative for injury, rash, and discoloration.                              

21:08 Psych: Negative for depression, anxiety, suicide ideation, homicidal ideation, and          

      hallucinations.                                                                             

21:08 Eyes: Positive for blurry vision.                                                           

21:08 Neuro: Positive for headache.                                                               

                                                                                                  

Exam:                                                                                             

21:08 Constitutional:  This is a well developed, well nourished patient who is awake, alert,  snw 

      and in no acute distress. Head/Face:  Normocephalic, atraumatic. Eyes:  Pupils equal        

      round and reactive to light, extra-ocular motions intact.  Lids and lashes normal.          

      Conjunctiva and sclera are non-icteric and not injected.  Cornea within normal limits.      

      Periorbital areas with no swelling, redness, or edema. ENT:  Nares patent. No nasal         

      discharge, no septal abnormalities noted.  Tympanic membranes are normal and external       

      auditory canals are clear.  Oropharynx with no redness, swelling, or masses, exudates,      

      or evidence of obstruction, uvula midline.  Mucous membranes moist. Neck:  Trachea          

      midline, no thyromegaly or masses palpated, and no cervical lymphadenopathy.  Supple,       

      full range of motion without nuchal rigidity, or vertebral point tenderness.  No            

      Meningismus. Chest/axilla:  Normal chest wall appearance and motion.  Nontender with no     

      deformity.  No lesions are appreciated. Cardiovascular:  Regular rate and rhythm with a     

      normal S1 and S2.  No gallops, murmurs, or rubs.  Normal PMI, no JVD.  No pulse             

      deficits. Respiratory:  Lungs have equal breath sounds bilaterally, clear to                

      auscultation and percussion.  No rales, rhonchi or wheezes noted.  No increased work of     

      breathing, no retractions or nasal flaring. Abdomen/GI:  Soft, non-tender, with normal      

      bowel sounds.  No distension or tympany.  No guarding or rebound.  No evidence of           

      tenderness throughout. Back:  No spinal tenderness.  No costovertebral tenderness.          

      Full range of motion. Skin:  Warm, dry with normal turgor.  Normal color with no            

      rashes, no lesions, and no evidence of cellulitis. Low transverse c/s last Thursday 5       

      days ago, staples intact, area clean, no erythema, no discharge MS/ Extremity:  Pulses      

      equal, no cyanosis.  Neurovascular intact.  Full, normal range of motion. Neuro:  Awake     

      and alert, GCS 15, oriented to person, place, time, and situation.  Cranial nerves          

      II-XII grossly intact.  Motor strength 5/5 in all extremities.  Sensory grossly intact.     

       Cerebellar exam normal.  Normal gait. Psych:  Awake, alert, with orientation to            

      person, place and time.  Behavior, mood, and affect are within normal limits.               

21:08 Cardiovascular: Rate: normal, Edema: pedal edema, ankle edema.                          snw 

                                                                                                  

Vital Signs:                                                                                      

20:55  / 97; Pulse 68; Resp 17; Temp 98; Pulse Ox 100% ; Weight 108.86 kg; Height 5 ft. rr5 

      7 in. (170.18 cm); Pain 10/10;                                                              

22:09  / 81; Pulse 54; Resp 18; Pulse Ox 99% ;                                          ea  

22:52  / 83; Pulse 57; Resp 18; Pulse Ox 100% ;                                         ea  

23:19  / 70; Pulse 53; Resp 17; Pulse Ox 100% ;                                         rr5 

20:55 Body Mass Index 37.59 (108.86 kg, 170.18 cm)                                            rr5 

                                                                                                  

Jose Coma Score:                                                                               

22:15 Eye Response: spontaneous(4). Verbal Response: oriented(5). Motor Response: obeys       snw 

      commands(6). Total: 15.                                                                     

                                                                                                  

MDM:                                                                                              

20:55 Patient medically screened.                                                             snw 

22:15 Data reviewed: vital signs, nurses notes. Data interpreted: Pulse oximetry: on room air snw 

      is 99 %. Interpretation: normal. Counseling: I had a detailed discussion with the           

      patient and/or guardian regarding: the historical points, exam findings, and any            

      diagnostic results supporting the discharge/admit diagnosis, the presence of at least       

      one elevated blood pressure reading (>120/80) during this emergency department visit,       

      lab results, radiology results.                                                             

                                                                                                  

                                                                                             

21:07 Order name: Magnesium; Complete Time: 22:28                                             snw 

                                                                                             

21:07 Order name: Lactate; Complete Time: 22:15                                               snw 

                                                                                             

21:07 Order name: LFT's; Complete Time: 22:28                                                 snw 

                                                                                             

21:07 Order name: CBC with Diff; Complete Time: 22:09                                         snw 

                                                                                             

21:07 Order name: Chem 7; Complete Time: 22:28                                                snw 

                                                                                             

21:07 Order name: Osmolality, Serum; Complete Time: 22:28                                     snw 

                                                                                             

21:07 Order name: CT Head Brain wo Cont; Complete Time: 21:54                                 snw 

                                                                                             

21:10 Order name: Chest Pa And Lat (2 Views) XRAY; Complete Time: 21:54                       snw 

                                                                                                  

Administered Medications:                                                                         

21:49 Drug: fentaNYL (PF) 25 mcg {Note: RASS 0.} Route: IVP; Site: right antecubital;         ea  

22:08 Follow up: Response: No adverse reaction; Pain is decreased                             ea  

21:52 Drug: NS 0.9% 1000 ml Route: IV; Rate: 75 ml/hr; Site: right antecubital;               rr5 

23:23 Follow up: Response: No adverse reaction; IV Status: Order to discontinue infusion; IV  rr5 

      Intake: 75ml                                                                                

22:55 Drug: Valium 10 mg Route: PO;                                                           rr5 

23:20 Follow up: Response: No adverse reaction; Pain is decreased                             rr5 

                                                                                                  

                                                                                                  

Disposition:                                                                                      

23:46 Co-signature as Attending Physician, Edd Hart MD.                                    rn  

                                                                                                  

Disposition:                                                                                      

20 22:45 Discharged to Home. Impression: Headache, Edema, unspecified.                      

- Condition is Stable.                                                                            

- Discharge Instructions: Edema, Hypertension, Preeclampsia and Eclampsia, Rehydration,           

  Adult, Form - Blood Pressure Record Sheet.                                                      

                                                                                                  

- Medication Reconciliation Form, Thank You Letter, Antibiotic Education, Prescription            

  Opioid Use form.                                                                                

- Follow up: Emergency Department; When: As needed; Reason: Worsening of condition.               

  Follow up: Private Physician; When: 1 - 2 days; Reason: Recheck today's complaints,             

  Continuance of care, Re-evaluation by your physician.                                           

- Notes: Measure heartrate prior to Labetalol. If less than 60 beats per minute, do not           

  take Labetalol. Avoid taking sumatriptan as it will raise blood pressure. Change                

  positions slowly. Increase fluid intake. Try small amount of caffeine daily.                    

                                                                                                  

                                                                                                  

Signatures:                                                                                       

Dispatcher [employer]                           EDMS                                                 

Fela Bowman, JOEYP-C                 FNP-Csnw                                                  

Edd Hart MD MD rn Antunez, Elena, RN RN ea Roque, Raymond RN                      RN   rr5                                                  

                                                                                                  

Corrections: (The following items were deleted from the chart)                                    

23:38 22:45 2020 22:45 Discharged to Home. Impression: Headache; Edema, unspecified.    rr5 

      Condition is Stable. Forms are Medication Reconciliation Form, Thank You Letter,            

      Antibiotic Education, Prescription Opioid Use. Follow up: Emergency Department; When:       

      As needed; Reason: Worsening of condition. Follow up: Private Physician; When: 1 - 2        

      days; Reason: Recheck today's complaints, Continuance of care, Re-evaluation by your        

      physician. snw                                                                              

                                                                                                  

**************************************************************************************************

## 2020-04-14 NOTE — RAD REPORT
EXAM DESCRIPTION:  RAD - Chest Pa And Lat (2 Views) - 4/14/2020 9:33 pm

 

CLINICAL HISTORY:  postpartum/pre-eclampsia

 

COMPARISON:  None

 

TECHNIQUE:  Frontal and lateral views of the chest were obtained.

 

FINDINGS:  The lungs are clear.   Heart size is normal and central vasculature is within normal limit
s.  No pleural effusion or pneumothorax seen.  No acute bony finding noted.  No aortic abnormality.

 

IMPRESSION:  No acute cardiopulmonary process.

## 2020-04-14 NOTE — ER
Nurse's Notes                                                                                     

 [facility]                                                                 

Name: Donald Perera                                                                          

Age: 29 yrs                                                                                       

Sex: Female                                                                                       

: 1990                                                                                   

MRN: [id number]                                                                                   

Arrival Date: 2020                                                                          

Time: 20:50                                                                                       

Account#: [id number]                                                                            

Bed 5                                                                                             

Private MD:                                                                                       

Diagnosis: Headache;Edema, unspecified                                                            

                                                                                                  

Presentation:                                                                                     

                                                                                             

20:55 Chief complaint: Patient states: I am having this severe headache started 3 days ago. I rr5 

      gave birth last Thursday under spinal tap, C -section and was diagnosed for pre             

      -eclampsia.                                                                                 

20:55 Coronavirus screen: Proceed with normal triage. Ebola Screen: Patient negative for      rr5 

      fever greater than or equal to 101.5 degrees Fahrenheit, and additional compatible          

      Ebola Virus Disease symptoms Patient denies exposure to infectious person. Patient          

      denies travel to an Ebola-affected area in the 21 days before illness onset. Initial        

      Sepsis Screen: Does the patient meet any 2 criteria? No. Patient's initial sepsis           

      screen is negative. Does the patient have a suspected source of infection? No.              

      Patient's initial sepsis screen is negative. Risk Assessment: Do you want to hurt           

      yourself or someone else? Patient reports no desire to harm self or others. Onset of        

      symptoms was 2020.                                                                

20:55 Method Of Arrival: Ambulatory                                                           rr5 

20:55 Acuity: CHRIS 3                                                                           rr5 

                                                                                                  

Triage Assessment:                                                                                

21:05 Headache History: The patient has had previous headaches and this one is more severe    rr5 

      than previous episodes.                                                                     

21:05 Pain: Also complains of inability to perform activities of daily living.                rr5 

                                                                                                  

OB/GYN:                                                                                           

21:25 LMP N/A - Recent birth                                                                  rr5 

                                                                                                  

Historical:                                                                                       

- Allergies:                                                                                      

21:06 Latex, Natural Rubber;                                                                  rr5 

- Home Meds:                                                                                      

21:06 ferrous sulfate 325 mg (65 mg iron) oral tab daily [Active]; phenobarbital 60 mg Oral   rr5 

      tab [Active]; clonidine HCl 0.1 mg Oral tab [Active]; sumatriptan succ 100 mg tablet        

      [Active]; tramadol 50 mg Oral tab [Active]; labetalol 100 mg Oral tab [Active];             

- PMHx:                                                                                           

21:06 pre eclampsia;                                                                          rr5 

- PSHx:                                                                                           

21:06 ;                                                                              rr5 

                                                                                                  

- Immunization history:: Adult Immunizations up to date.                                          

- Social history:: Smoking status: unknown Patient/guardian denies using alcohol,                 

  street drugs, tobacco products.                                                                 

                                                                                                  

                                                                                                  

Screenin:07 Abuse screen: Denies threats or abuse. Denies injuries from another. Nutritional        rr5 

      screening: No deficits noted. Tuberculosis screening: No symptoms or risk factors           

      identified.                                                                                 

21:30 Fall Risk IV access (20 points). Total Rucker Fall Scale indicates No Risk (0-24 pts).   rr5 

                                                                                                  

Assessment:                                                                                       

21:05 General: Appears in no apparent distress. uncomfortable, Behavior is calm.              rr5 

21:05 Pain: Complains of pain in forehead Pain radiates to top of the head Pain currently is  rr5 

      10 out of 10 on a pain scale. Quality of pain is described as aching, Pain began            

      gradually, Is intermittent. Neuro: Level of Consciousness is awake, alert, obeys            

      commands, Oriented to person, place, time, situation, Reports headache frontal area.        

      Cardiovascular: Capillary refill < 3 seconds Patient's skin is warm and dry.                

      Respiratory: Airway is patent Respiratory effort is even, unlabored, Respiratory            

      pattern is regular, symmetrical. GI: No signs and/or symptoms were reported involving       

      the gastrointestinal system. Reports intolerance of food, Patient currently denies          

      nausea, vomiting. : No signs and/or symptoms were reported regarding the                  

      genitourinary system. EENT: No signs and/or symptoms were reported regarding the EENT       

      system. Derm: Skin is intact, is healthy with good turgor, Skin temperature is warm.        

      Musculoskeletal: Circulation, motion, and sensation intact. Capillary refill < 3            

      seconds.                                                                                    

21:25 Reassessment: went to CT via wheelchair assisted by CT staff.                           rr5 

22:08 Reassessment: Patient and/or family updated on plan of care and expected duration. Pain ea  

      level reassessed. Patient is alert, oriented x 3, equal unlabored respirations, skin        

      warm/dry/pink. Awaiting on lab results.                                                     

22:50 Reassessment: Patient appears in no apparent distress at this time. Patient is alert,   rr5 

      oriented x 3, equal unlabored respirations, skin warm/dry/pink. ED provider aware for       

      the BP with order made and carried out. Patient states symptoms have improved.              

23:19 Reassessment: Patient appears in no apparent distress at this time. Patient is alert,   rr5 

      oriented x 3, equal unlabored respirations, skin warm/dry/pink. BP rechecked. discharge     

      instruction given and explained without complaints made, verbalized understading.           

23:20 Reassessment: waiting for her  for the ride home.                                rr5 

23:39 Reassessment: assisted going to her car thru wheelchair, accompanied by her .    rr5 

                                                                                                  

Vital Signs:                                                                                      

20:55  / 97; Pulse 68; Resp 17; Temp 98; Pulse Ox 100% ; Weight 108.86 kg; Height 5 ft. rr5 

      7 in. (170.18 cm); Pain 10/10;                                                              

22:09  / 81; Pulse 54; Resp 18; Pulse Ox 99% ;                                          ea  

22:52  / 83; Pulse 57; Resp 18; Pulse Ox 100% ;                                         ea  

23:19  / 70; Pulse 53; Resp 17; Pulse Ox 100% ;                                         rr5 

20:55 Body Mass Index 37.59 (108.86 kg, 170.18 cm)                                            rr5 

                                                                                                  

Jose Coma Score:                                                                               

22:15 Eye Response: spontaneous(4). Verbal Response: oriented(5). Motor Response: obeys       snw 

      commands(6). Total: 15.                                                                     

                                                                                                  

ED Course:                                                                                        

20:50 Patient arrived in ED.                                                                  bp1 

20:53 Fela Bowman FNP-C is PHCP.                                                        snw 

20:53 Edd Hart MD is Attending Physician.                                                snw 

20:58 Rasheed Padilla, RN is Primary Nurse.                                                    rr5 

21:02 Triage completed.                                                                       rr5 

21:07 Arm band placed on right wrist.                                                         rr5 

21:30 Chest Pa And Lat (2 Views) XRAY In Process Unspecified.                                 EDMS

21:34 CT Head Brain wo Cont In Process Unspecified.                                           EDMS

21:38 Inserted saline lock: 20 gauge in right antecubital area, using aseptic technique.      ea  

21:49 Patient has correct armband on for positive identification. Placed in gown. Bed in low  ea  

      position. Call light in reach. Side rails up X2.                                            

23:20 No provider procedures requiring assistance completed. IV discontinued, intact,         rr5 

      bleeding controlled, No redness/swelling at site. Pressure dressing applied.                

                                                                                                  

Administered Medications:                                                                         

21:49 Drug: fentaNYL (PF) 25 mcg {Note: RASS 0.} Route: IVP; Site: right antecubital;         ea  

22:08 Follow up: Response: No adverse reaction; Pain is decreased                             ea  

21:52 Drug: NS 0.9% 1000 ml Route: IV; Rate: 75 ml/hr; Site: right antecubital;               rr5 

23:23 Follow up: Response: No adverse reaction; IV Status: Order to discontinue infusion; IV  rr5 

      Intake: 75ml                                                                                

22:55 Drug: Valium 10 mg Route: PO;                                                           rr5 

23:20 Follow up: Response: No adverse reaction; Pain is decreased                             rr5 

                                                                                                  

                                                                                                  

Intake:                                                                                           

23:23 IV: 75ml; Total: 75ml.                                                                  rr5 

                                                                                                  

Outcome:                                                                                          

22:45 Discharge ordered by MD. bowser 

23:20 Discharged to home via wheelchair, with family.                                         rr5 

23:20 Condition: stable                                                                           

23:20 Discharge instructions given to patient, Instructed on discharge instructions, follow       

      up and referral plans. Demonstrated understanding of instructions, follow-up care.          

23:38 Patient left the ED.                                                                    rr5 

                                                                                                  

Signatures:                                                                                       

Dispatcher [employer]                           EDMS                                                 

Fela Bowman, AMI-C                 FNP-Csnw                                                  

Julissa Qureshi RN                      Rasheed Ogden ea, RN                      RN   rr5                                                  

Ann Gaytan                                                  

                                                                                                  

**************************************************************************************************

## 2020-04-14 NOTE — RAD REPORT
EXAM DESCRIPTION:  CT - Head Brain Wo Cont - 4/14/2020 9:35 pm

 

CLINICAL HISTORY:  post partum/pre-eclampsia

 

COMPARISON:  No comparisonsNo comparisons

 

TECHNIQUE:  Axial 5 mm thick images of the head were obtained without IV contrast.

 

All CT scans are performed using dose optimization technique as appropriate and may include automated
 exposure control or mA/KV adjustment according to patient size.

 

FINDINGS:  No intracranial hemorrhage, mass, edema or shift of mid-line structures. No acute infarcti
on changes seen. No abnormal extra-axial fluid collections. Ventricles are normal.

 

Mastoid air cells and visualized portions of the paranasal sinuses are clear.

 

No acute bony findings.

 

 

IMPRESSION:  Negative non-contrast CT head examination.

 

Major venous sinuses are grossly normal on this examination. Assessment is limited on a noncontrast s
tudy.

## 2022-05-05 NOTE — RAD REPORT
EXAM DESCRIPTION:  RAD - Abdomen Single View - 4/8/2020 5:15 pm

 

CLINICAL HISTORY:  fetal presentation

 

COMPARISON:

No relevant comparison

 

FINDINGS:  Single gestation is identified. Cephalic presentation seen with spine to the maternal left
.

 

No abnormal bowel gas pattern. Well filled urinary bladder is seen. Delete select

 

 

IMPRESSION:  Single gestation in cephalic presentation, spine to the maternal left.
no hoarseness